# Patient Record
Sex: FEMALE | Race: WHITE | NOT HISPANIC OR LATINO
[De-identification: names, ages, dates, MRNs, and addresses within clinical notes are randomized per-mention and may not be internally consistent; named-entity substitution may affect disease eponyms.]

---

## 2020-05-26 ENCOUNTER — APPOINTMENT (OUTPATIENT)
Dept: OTOLARYNGOLOGY | Facility: CLINIC | Age: 40
End: 2020-05-26
Payer: COMMERCIAL

## 2020-05-26 VITALS
DIASTOLIC BLOOD PRESSURE: 78 MMHG | BODY MASS INDEX: 23.7 KG/M2 | HEIGHT: 69 IN | WEIGHT: 160 LBS | SYSTOLIC BLOOD PRESSURE: 114 MMHG

## 2020-05-26 DIAGNOSIS — M26.609 UNSPECIFIED TEMPOROMANDIBULAR JOINT DISORDER: ICD-10-CM

## 2020-05-26 DIAGNOSIS — F17.200 NICOTINE DEPENDENCE, UNSPECIFIED, UNCOMPLICATED: ICD-10-CM

## 2020-05-26 DIAGNOSIS — Z78.9 OTHER SPECIFIED HEALTH STATUS: ICD-10-CM

## 2020-05-26 DIAGNOSIS — Z83.52 FAMILY HISTORY OF EAR DISORDERS: ICD-10-CM

## 2020-05-26 PROBLEM — Z00.00 ENCOUNTER FOR PREVENTIVE HEALTH EXAMINATION: Status: ACTIVE | Noted: 2020-05-26

## 2020-05-26 PROCEDURE — 92557 COMPREHENSIVE HEARING TEST: CPT

## 2020-05-26 PROCEDURE — 99204 OFFICE O/P NEW MOD 45 MIN: CPT | Mod: 25

## 2020-05-26 PROCEDURE — 92570 ACOUSTIC IMMITANCE TESTING: CPT

## 2020-05-26 NOTE — ASSESSMENT
[FreeTextEntry1] : - For TMJ: \par - discussed ibuprofen 800mg up to TID, she is not interested in this. Continue PRN advil\par - avoid tough foods\par - use warm compresses\par \par - VNG for dizziness\par \par - f/up in 3-4 weeks

## 2020-05-26 NOTE — PHYSICAL EXAM
[Midline] : trachea located in midline position [Normal] : no rashes [] : Bozrah-Hallpike test is negative

## 2020-05-26 NOTE — HISTORY OF PRESENT ILLNESS
[de-identified] : 40 year old patient is present today with c/o left otalgia. Patient states it started about two years ago. She states it is becoming more frequent recently. Patient states she describes them as episodes, each starting as pain and discomfort in pain, which leads to nausea, vomiting and dizziness. Starts with left otalgia leading to a severe headache and nausea. Some jaw pain at times. No hearing loss. Pressure and fullness in her ear at times. She states she gets relief when cracking her neck to the left. Patient notes family h/o Menieres Disease - aunt. Most recent episode was 1 week ago, pain, nausea and dizziness, no emesis. Dizziness is room spinning. Episode lasts several hours, stops when she lays down to rest. She takes Advil for it, which helps.

## 2020-08-31 ENCOUNTER — APPOINTMENT (OUTPATIENT)
Dept: OTOLARYNGOLOGY | Facility: CLINIC | Age: 40
End: 2020-08-31
Payer: COMMERCIAL

## 2020-08-31 DIAGNOSIS — R42 DIZZINESS AND GIDDINESS: ICD-10-CM

## 2020-08-31 DIAGNOSIS — H92.02 OTALGIA, LEFT EAR: ICD-10-CM

## 2020-08-31 PROCEDURE — 99213 OFFICE O/P EST LOW 20 MIN: CPT

## 2020-08-31 NOTE — ASSESSMENT
[FreeTextEntry1] : - will obtain MRI IAC and brain\par - referral to neurology\par - f/up in 1-2 months

## 2020-08-31 NOTE — HISTORY OF PRESENT ILLNESS
[de-identified] : 40 year old patient is present today with c/o left otalgia. Patient states it started about two years ago. She states it is becoming more frequent recently. Patient states she describes them as episodes, each starting as pain and discomfort in pain, which leads to nausea, vomiting and dizziness. Starts with left otalgia leading to a severe headache and nausea. Some jaw pain at times. No hearing loss. Pressure and fullness in her ear at times. She states she gets relief when cracking her neck to the left. Patient notes family h/o Menieres Disease - aunt. Most recent episode was 1 week ago, pain, nausea and dizziness, no emesis. Dizziness is room spinning. Episode lasts several hours, stops when she lays down to rest. She takes Advil for it, which helps.  [FreeTextEntry1] : \par 8/31/2020: Patient presents today following up on left otalgia and left head pain. Patient admits headaches recently. Heaviness feeling. Patient recently had VNG. She admits headaches worsening over the past two weeks. Symptoms have remained stable since May 2020. No new symptoms. \par \par She notes difficulty refocusing after reading her iphone, had her eyes checked 2 years ago.

## 2020-11-11 ENCOUNTER — LABORATORY RESULT (OUTPATIENT)
Age: 40
End: 2020-11-11

## 2020-11-11 ENCOUNTER — APPOINTMENT (OUTPATIENT)
Dept: UROLOGY | Facility: CLINIC | Age: 40
End: 2020-11-11
Payer: COMMERCIAL

## 2020-11-11 VITALS — WEIGHT: 158 LBS | HEIGHT: 69 IN | BODY MASS INDEX: 23.4 KG/M2 | TEMPERATURE: 98.1 F

## 2020-11-11 DIAGNOSIS — R32 UNSPECIFIED URINARY INCONTINENCE: ICD-10-CM

## 2020-11-11 DIAGNOSIS — R39.15 URGENCY OF URINATION: ICD-10-CM

## 2020-11-11 DIAGNOSIS — R35.0 FREQUENCY OF MICTURITION: ICD-10-CM

## 2020-11-11 PROCEDURE — 99203 OFFICE O/P NEW LOW 30 MIN: CPT

## 2020-11-11 PROCEDURE — 99072 ADDL SUPL MATRL&STAF TM PHE: CPT

## 2020-11-11 NOTE — HISTORY OF PRESENT ILLNESS
[FreeTextEntry1] : 40-year-old with frequency and urgency worsening over the last one year along with some urgency incontinence. She uses a pad and changes or frequent during the day but doesn't know how many. She has nocturia x0. There is no hematuria

## 2021-01-06 ENCOUNTER — APPOINTMENT (OUTPATIENT)
Dept: UROLOGY | Facility: CLINIC | Age: 41
End: 2021-01-06

## 2024-08-15 ENCOUNTER — APPOINTMENT (OUTPATIENT)
Dept: VASCULAR SURGERY | Facility: CLINIC | Age: 44
End: 2024-08-15